# Patient Record
Sex: MALE | Race: WHITE | NOT HISPANIC OR LATINO
[De-identification: names, ages, dates, MRNs, and addresses within clinical notes are randomized per-mention and may not be internally consistent; named-entity substitution may affect disease eponyms.]

---

## 2024-03-04 ENCOUNTER — APPOINTMENT (OUTPATIENT)
Dept: ORTHOPEDIC SURGERY | Facility: CLINIC | Age: 69
End: 2024-03-04

## 2024-03-11 ENCOUNTER — APPOINTMENT (OUTPATIENT)
Dept: ORTHOPEDIC SURGERY | Facility: CLINIC | Age: 69
End: 2024-03-11
Payer: MEDICARE

## 2024-03-11 VITALS — BODY MASS INDEX: 28.64 KG/M2 | HEIGHT: 68 IN | WEIGHT: 189 LBS

## 2024-03-11 DIAGNOSIS — E78.00 PURE HYPERCHOLESTEROLEMIA, UNSPECIFIED: ICD-10-CM

## 2024-03-11 DIAGNOSIS — E11.9 TYPE 2 DIABETES MELLITUS W/OUT COMPLICATIONS: ICD-10-CM

## 2024-03-11 DIAGNOSIS — Z85.828 PERSONAL HISTORY OF OTHER MALIGNANT NEOPLASM OF SKIN: ICD-10-CM

## 2024-03-11 DIAGNOSIS — Z87.891 PERSONAL HISTORY OF NICOTINE DEPENDENCE: ICD-10-CM

## 2024-03-11 DIAGNOSIS — Z85.46 PERSONAL HISTORY OF MALIGNANT NEOPLASM OF PROSTATE: ICD-10-CM

## 2024-03-11 DIAGNOSIS — I10 ESSENTIAL (PRIMARY) HYPERTENSION: ICD-10-CM

## 2024-03-11 PROCEDURE — 73010 X-RAY EXAM OF SHOULDER BLADE: CPT | Mod: LT

## 2024-03-11 PROCEDURE — 73030 X-RAY EXAM OF SHOULDER: CPT | Mod: LT

## 2024-03-11 PROCEDURE — 99204 OFFICE O/P NEW MOD 45 MIN: CPT

## 2024-03-11 RX ORDER — LORATADINE 10 MG/1
10 TABLET ORAL
Refills: 0 | Status: ACTIVE | COMMUNITY

## 2024-03-11 RX ORDER — FENOFIBRATE 134 MG/1
134 CAPSULE ORAL
Refills: 0 | Status: ACTIVE | COMMUNITY

## 2024-03-11 RX ORDER — HYDROCHLOROTHIAZIDE 25 MG/1
25 TABLET ORAL
Refills: 0 | Status: ACTIVE | COMMUNITY

## 2024-03-11 RX ORDER — ATENOLOL 100 MG/1
100 TABLET ORAL
Refills: 0 | Status: ACTIVE | COMMUNITY

## 2024-03-11 RX ORDER — ASPIRIN 81 MG
81 TABLET, DELAYED RELEASE (ENTERIC COATED) ORAL
Refills: 0 | Status: ACTIVE | COMMUNITY

## 2024-03-11 RX ORDER — METFORMIN HYDROCHLORIDE 500 MG/1
500 TABLET, COATED ORAL
Refills: 0 | Status: ACTIVE | COMMUNITY

## 2024-03-11 RX ORDER — OMEPRAZOLE 20 MG/1
20 CAPSULE, DELAYED RELEASE ORAL
Refills: 0 | Status: ACTIVE | COMMUNITY

## 2024-03-11 RX ORDER — ATORVASTATIN CALCIUM 40 MG/1
40 TABLET, FILM COATED ORAL
Refills: 0 | Status: ACTIVE | COMMUNITY

## 2024-03-11 RX ORDER — CELECOXIB 200 MG/1
200 CAPSULE ORAL TWICE DAILY
Qty: 60 | Refills: 0 | Status: COMPLETED | COMMUNITY
Start: 2024-03-11 | End: 2024-04-10

## 2024-03-11 NOTE — ASSESSMENT
[FreeTextEntry1] : We reviewed the findings and the history. Questions were answered and concerns addressed. The options were outlined. PT is planned.  Celebrex is prescribed.  Celebrex has worked for him in the past. If symptoms persist, an MRI is planned.   Patient was seen by Dr. Boyd Costa. Patient was seen by Felicia OLIVEIRA under the supervision of Dr. Boyd Costa. Progress note was completed by Felicia OLIVEIRA. Entered by Gay Dickerson acting as scribe.

## 2024-03-11 NOTE — REASON FOR VISIT
[FreeTextEntry2] : This is a 68 year old RHD retired M with left shoulder pain since February 2024.  No injury.  Sleeping is uncomfortable.  Reaching is sore.  No numbness or medications.  His last A1C was 7.4.  He had surgery on his right rotator cuff around 2009, and this side is perfect.

## 2024-03-11 NOTE — HISTORY OF PRESENT ILLNESS
[de-identified] : 67yo HD M here for left shoulder pain that began 1 month ago. No injury. There are night symptoms. Pain is worse when laying down.  No formal treatment so far. Saw Dr. Costa for RTC tear, repaired surgically. Cannot recall if it was right or left.

## 2024-03-11 NOTE — CONSULT LETTER
[Dear  ___] : Dear  [unfilled], [Consult Letter:] : I had the pleasure of evaluating your patient, [unfilled]. [Please see my note below.] : Please see my note below. [Sincerely,] : Sincerely, [Consult Closing:] : Thank you very much for allowing me to participate in the care of this patient.  If you have any questions, please do not hesitate to contact me. [FreeTextEntry3] : Boyd Costa M.D. Shoulder Surgery

## 2024-03-11 NOTE — PHYSICAL EXAM
[Left] : left shoulder [Mild] : mild [Sitting] : sitting [5 ___] : forward flexion 5[unfilled]/5 [5___] : external rotation 5[unfilled]/5 [Right] : right shoulder [] : healed incision [TWNoteComboBox4] : passive forward flexion 150 degrees [de-identified] : external rotation 50 degrees [TWNoteComboBox6] : internal rotation L1

## 2024-04-01 ENCOUNTER — APPOINTMENT (OUTPATIENT)
Dept: ORTHOPEDIC SURGERY | Facility: CLINIC | Age: 69
End: 2024-04-01
Payer: MEDICARE

## 2024-04-01 PROCEDURE — 99214 OFFICE O/P EST MOD 30 MIN: CPT

## 2024-04-01 NOTE — REASON FOR VISIT
[FreeTextEntry2] : This is a 68 year old RHD retired M with left shoulder pain since February 2024.  No injury.  No numbness.  His last A1C was 7.4.  He had surgery on his right rotator cuff around 2009, and this side is perfect.  The Celebrex hasn't helped.  He has gone to PT without much difference.

## 2024-04-01 NOTE — PHYSICAL EXAM
[Left] : left shoulder [Mild] : mild [Sitting] : sitting [5 ___] : forward flexion 5[unfilled]/5 [5___] : external rotation 5[unfilled]/5 [Right] : right shoulder [Moderate] : moderate [] : no sensory deficits [TWNoteComboBox6] : internal rotation L1 [de-identified] : external rotation 50 degrees [TWNoteComboBox4] : passive forward flexion 150 degrees

## 2024-04-02 ENCOUNTER — RESULT REVIEW (OUTPATIENT)
Age: 69
End: 2024-04-02

## 2024-04-08 RX ORDER — DICLOFENAC SODIUM 75 MG/1
75 TABLET, DELAYED RELEASE ORAL
Qty: 40 | Refills: 0 | Status: ACTIVE | COMMUNITY
Start: 2024-04-08 | End: 1900-01-01

## 2024-04-22 ENCOUNTER — APPOINTMENT (OUTPATIENT)
Dept: ORTHOPEDIC SURGERY | Facility: CLINIC | Age: 69
End: 2024-04-22
Payer: MEDICARE

## 2024-04-22 VITALS — BODY MASS INDEX: 28.64 KG/M2 | HEIGHT: 68 IN | WEIGHT: 189 LBS

## 2024-04-22 PROCEDURE — 99214 OFFICE O/P EST MOD 30 MIN: CPT

## 2024-04-22 RX ORDER — MELOXICAM 15 MG/1
15 TABLET ORAL
Qty: 30 | Refills: 0 | Status: ACTIVE | COMMUNITY
Start: 2024-04-22 | End: 1900-01-01

## 2024-04-22 NOTE — PHYSICAL EXAM
[Left] : left shoulder [Sitting] : sitting [Moderate] : moderate [5 ___] : forward flexion 5[unfilled]/5 [5___] : external rotation 5[unfilled]/5 [Right] : right shoulder [] : no swelling [TWNoteComboBox4] : passive forward flexion 150 degrees [TWNoteComboBox6] : internal rotation L1 [de-identified] : external rotation 50 degrees

## 2024-04-22 NOTE — DATA REVIEWED
[FreeTextEntry1] : MRI LEFT SHOULDER images interpreted 4/2/24:  There is at least a medium full thickness supraspinatus tear 2.6cm. There is atrophy of the muscle belly. There is moderate AC joint arthrosis and evidence of prior decompression.  The biceps has ruptured.  There is partial subscap tearing.

## 2024-04-22 NOTE — REASON FOR VISIT
[FreeTextEntry2] : This is a 68 year old RHD retired M with left shoulder pain since February 2024.  No injury.  No numbness.  His last A1C was 7.4.  He had a left shoulder RTC repair and SAD 2009.  The Celebrex hasn't helped.  He has gone to PT without much difference.  He has been using CBD cream with some relief. An MRI was done.

## 2024-04-22 NOTE — ASSESSMENT
[FreeTextEntry1] : The MRI and imaging were reviewed. He is losing motion.  Meloxicam is planned.  PT is planned. If he does not progress, revision surgery can be an option.  Follow-up in 4-6 weeks. He will bring his wife next visit.    Patient was seen by Dr. Boyd Costa. Progress note completed by Sheri OLIVEIRA.

## 2024-05-06 ENCOUNTER — APPOINTMENT (OUTPATIENT)
Dept: ORTHOPEDIC SURGERY | Facility: CLINIC | Age: 69
End: 2024-05-06
Payer: MEDICARE

## 2024-05-06 PROCEDURE — 99214 OFFICE O/P EST MOD 30 MIN: CPT

## 2024-05-06 NOTE — PHYSICAL EXAM
[Left] : left shoulder [Sitting] : sitting [Moderate] : moderate [5 ___] : forward flexion 5[unfilled]/5 [5___] : external rotation 5[unfilled]/5 [Right] : right shoulder [] : healed incision

## 2024-05-17 ENCOUNTER — APPOINTMENT (OUTPATIENT)
Dept: ORTHOPEDIC SURGERY | Facility: CLINIC | Age: 69
End: 2024-05-17

## 2024-05-17 PROCEDURE — XXXXX: CPT | Mod: 1L

## 2024-05-17 RX ORDER — KETOROLAC TROMETHAMINE 10 MG/1
10 TABLET, FILM COATED ORAL EVERY 6 HOURS
Qty: 20 | Refills: 0 | Status: ACTIVE | COMMUNITY
Start: 2024-05-17 | End: 1900-01-01

## 2024-05-17 RX ORDER — GABAPENTIN 300 MG/1
300 CAPSULE ORAL 3 TIMES DAILY
Qty: 15 | Refills: 0 | Status: ACTIVE | COMMUNITY
Start: 2024-05-17 | End: 1900-01-01

## 2024-05-23 ENCOUNTER — APPOINTMENT (OUTPATIENT)
Age: 69
End: 2024-05-23
Payer: MEDICARE

## 2024-05-23 PROCEDURE — 29823 SHO ARTHRS SRG XTNSV DBRDMT: CPT | Mod: AS,LT

## 2024-05-23 PROCEDURE — 29823 SHO ARTHRS SRG XTNSV DBRDMT: CPT | Mod: LT

## 2024-05-23 PROCEDURE — 29826 SHO ARTHRS SRG DECOMPRESSION: CPT | Mod: AS,LT

## 2024-05-23 PROCEDURE — 29827 SHO ARTHRS SRG RT8TR CUF RPR: CPT | Mod: AS,LT

## 2024-05-23 PROCEDURE — 29826 SHO ARTHRS SRG DECOMPRESSION: CPT | Mod: LT

## 2024-05-23 PROCEDURE — 29827 SHO ARTHRS SRG RT8TR CUF RPR: CPT | Mod: LT

## 2024-05-29 ENCOUNTER — APPOINTMENT (OUTPATIENT)
Dept: ORTHOPEDIC SURGERY | Facility: CLINIC | Age: 69
End: 2024-05-29
Payer: MEDICARE

## 2024-05-29 PROCEDURE — 99024 POSTOP FOLLOW-UP VISIT: CPT

## 2024-05-29 PROCEDURE — 73030 X-RAY EXAM OF SHOULDER: CPT | Mod: LT

## 2024-05-29 NOTE — REASON FOR VISIT
[FreeTextEntry2] : This is a 68 year old RHD retired M with left shoulder pain since February 2024. No injury.  He had a left shoulder RTC repair and SAD 2009.  DOS: 5/23/2024 Procedure: Left Shoulder Arthroscopy, Glenohumeral Debridement, Loose Body Hardware Removal, Subacromial Decompression, Revision Medium Rotator Cuff Repair Diagnosis: Recurrent Medium Rotator Cuff Tear, Biceps Rupture, Subacromial Impingement, Shoulder Pain, Glenohumeral Synovitis, Glenohumeral Chondrosis, SLAP Tear, Partial Anterior Labral Tear, Partial Posterior Labral Tear, Loose Suture Hardware  No f/c/s or PT.  He is in his sling.

## 2024-05-29 NOTE — PHYSICAL EXAM
[Left] : left shoulder [] : no sensory deficits [FreeTextEntry9] : Range of motion was not assessed. [de-identified] : Strength was not assessed.

## 2024-05-29 NOTE — ASSESSMENT
[FreeTextEntry1] : We reviewed the scope pictures. PT outlined. Sling use outlined. Table slides demonstrated and advised.  Questions answered.  Patient was seen by Dr. Boyd Costa. Patient was seen by Felicia OLIVEIRA under the supervision of Dr. Boyd Costa. Progress note was completed by Felicia OLIVEIRA. Entered by Gay Dickerson acting as scribe.

## 2024-06-24 ENCOUNTER — APPOINTMENT (OUTPATIENT)
Dept: ORTHOPEDIC SURGERY | Facility: CLINIC | Age: 69
End: 2024-06-24
Payer: MEDICARE

## 2024-06-24 VITALS — WEIGHT: 186 LBS | BODY MASS INDEX: 28.19 KG/M2 | HEIGHT: 68 IN

## 2024-06-24 DIAGNOSIS — M75.42 IMPINGEMENT SYNDROME OF LEFT SHOULDER: ICD-10-CM

## 2024-06-24 DIAGNOSIS — M75.122 COMPLETE ROTATOR CUFF TEAR OR RUPTURE OF LEFT SHOULDER, NOT SPECIFIED AS TRAUMATIC: ICD-10-CM

## 2024-06-24 PROCEDURE — 99024 POSTOP FOLLOW-UP VISIT: CPT

## 2024-06-24 RX ORDER — HYDROCODONE BITARTRATE AND ACETAMINOPHEN 7.5; 325 MG/1; MG/1
7.5-325 TABLET ORAL
Qty: 42 | Refills: 0 | Status: ACTIVE | COMMUNITY
Start: 2024-05-17 | End: 1900-01-01

## 2024-07-24 ENCOUNTER — APPOINTMENT (OUTPATIENT)
Dept: ORTHOPEDIC SURGERY | Facility: CLINIC | Age: 69
End: 2024-07-24

## 2024-08-16 ENCOUNTER — APPOINTMENT (OUTPATIENT)
Dept: ORTHOPEDIC SURGERY | Facility: CLINIC | Age: 69
End: 2024-08-16
Payer: MEDICARE

## 2024-08-16 DIAGNOSIS — M75.122 COMPLETE ROTATOR CUFF TEAR OR RUPTURE OF LEFT SHOULDER, NOT SPECIFIED AS TRAUMATIC: ICD-10-CM

## 2024-08-16 DIAGNOSIS — M75.42 IMPINGEMENT SYNDROME OF LEFT SHOULDER: ICD-10-CM

## 2024-08-16 PROCEDURE — 99024 POSTOP FOLLOW-UP VISIT: CPT

## 2024-08-16 RX ORDER — METHYLPREDNISOLONE 4 MG/1
4 TABLET ORAL
Qty: 1 | Refills: 0 | Status: ACTIVE | COMMUNITY
Start: 2024-08-16 | End: 1900-01-01

## 2024-08-16 NOTE — ASSESSMENT
[FreeTextEntry1] : We reviewed the findings. This seems to be more inflammation. MDP planned. PT will continue. Questions answered.  Patient was seen by Dr. Boyd Costa. Patient was seen by Felicia OLIVEIRA under the supervision of Dr. Boyd Costa. Progress note was completed by Felicia OLIVEIRA.

## 2024-08-16 NOTE — HISTORY OF PRESENT ILLNESS
[Retired] : Work status: retired [de-identified] : PO #3  L shoulder in the sling, doing better , has pain to sleep  [] : Post Surgical Visit: no

## 2024-08-16 NOTE — PHYSICAL EXAM
[Left] : left shoulder [Sitting] : sitting [Moderate] : moderate [Mild] : mild [] : no sensory deficits [FreeTextEntry9] : IR was not assessed. [de-identified] : Strength was not assessed. [de-identified] : +arc [TWNoteComboBox4] : passive forward flexion 160 degrees [de-identified] : external rotation 50 degrees

## 2024-08-16 NOTE — REASON FOR VISIT
[FreeTextEntry2] : This is a 69 year old RHD retired M with left shoulder pain since February 2024. No injury.  He had a left shoulder RTC repair and SAD 2009.  DOS: 5/23/2024 Procedure: Left Shoulder Arthroscopy, Glenohumeral Debridement, Loose Body Hardware Removal, Subacromial Decompression, Revision Medium Rotator Cuff Repair Diagnosis: Recurrent Medium Rotator Cuff Tear, Biceps Rupture, Subacromial Impingement, Shoulder Pain, Glenohumeral Synovitis, Glenohumeral Chondrosis, SLAP Tear, Partial Anterior Labral Tear, Partial Posterior Labral Tear, Loose Suture Hardware  He is out of the sling.  He continues with PT.  He did lift a 1 lb weight on 8/6/24 then stretched with therapy and had soreness in the following days.  Getting a jar out of the refrigerator was painful.  He is sleeping OK.  He resumed his Mobic with some relief.  His recent A1C was 6.7.

## 2024-09-27 ENCOUNTER — APPOINTMENT (OUTPATIENT)
Dept: ORTHOPEDIC SURGERY | Facility: CLINIC | Age: 69
End: 2024-09-27
Payer: MEDICARE

## 2024-09-27 VITALS — HEIGHT: 68 IN | WEIGHT: 186 LBS | BODY MASS INDEX: 28.19 KG/M2

## 2024-09-27 DIAGNOSIS — M75.122 COMPLETE ROTATOR CUFF TEAR OR RUPTURE OF LEFT SHOULDER, NOT SPECIFIED AS TRAUMATIC: ICD-10-CM

## 2024-09-27 PROCEDURE — 99213 OFFICE O/P EST LOW 20 MIN: CPT

## 2024-09-27 NOTE — PHYSICAL EXAM
[Left] : left shoulder [Sitting] : sitting [Moderate] : moderate [Mild] : mild [5 ___] : forward flexion 5[unfilled]/5 [] : no sensory deficits [FreeTextEntry9] : IR was not assessed. [de-identified] : +arc [TWNoteComboBox4] : passive forward flexion 165 degrees [TWNoteComboBox6] : internal rotation L1 [de-identified] : external rotation 60 degrees

## 2024-09-27 NOTE — ASSESSMENT
[FreeTextEntry1] : He is progressing nicely. We reviewed his course. He will begin HEP since his PT is exhausted. We discussed NSAID use as needed. He still needs Advil nicely. Follow up 2 months.  Patient seen by Dr. Boyd Costa, who determined the assessment and plan. Felicia Suggs PAC was present for and participated in aspects of the office encounter. I, Maggi Smith, am scribing for Dr. Boyd Costa in his presence for the chief complaint, physical exam, studies, assessment, and/or plan.

## 2024-09-27 NOTE — HISTORY OF PRESENT ILLNESS
[2] : 2 [1] : 2 [Occasional] : occasional [de-identified] : Patient here for follow up on the left shoulder. Patient states that he is feeling a lot better compared to before. He does feel sore still though. Patient was going to PT; however, Medicare is no longer covering it for him, so he is now doing his own at home PT.   [FreeTextEntry1] : Left Shoulder  [FreeTextEntry6] : Sore

## 2024-09-27 NOTE — REASON FOR VISIT
[FreeTextEntry2] : This is a 69 year old RHD retired M with left shoulder pain since February 2024. No injury.  He had a left shoulder RTC repair and SAD 2009.  DOS: 5/23/2024 Procedure: Left Shoulder Arthroscopy, Glenohumeral Debridement, Loose Body Hardware Removal, Subacromial Decompression, Revision Medium Rotator Cuff Repair Diagnosis: Recurrent Medium Rotator Cuff Tear, Biceps Rupture, Subacromial Impingement, Shoulder Pain, Glenohumeral Synovitis, Glenohumeral Chondrosis, SLAP Tear, Partial Anterior Labral Tear, Partial Posterior Labral Tear, Loose Suture Hardware  The MDP helped.  He does his HEP.  [facility] stopped his PT in early September 2024.  There is pain at night.

## 2024-11-22 ENCOUNTER — APPOINTMENT (OUTPATIENT)
Dept: ORTHOPEDIC SURGERY | Facility: CLINIC | Age: 69
End: 2024-11-22
Payer: MEDICARE

## 2024-11-22 VITALS — BODY MASS INDEX: 28.64 KG/M2 | WEIGHT: 189 LBS | HEIGHT: 68 IN

## 2024-11-22 VITALS — BODY MASS INDEX: 28.19 KG/M2 | WEIGHT: 186 LBS | HEIGHT: 68 IN

## 2024-11-22 DIAGNOSIS — M75.122 COMPLETE ROTATOR CUFF TEAR OR RUPTURE OF LEFT SHOULDER, NOT SPECIFIED AS TRAUMATIC: ICD-10-CM

## 2024-11-22 DIAGNOSIS — M75.42 IMPINGEMENT SYNDROME OF LEFT SHOULDER: ICD-10-CM

## 2024-11-22 PROCEDURE — 99213 OFFICE O/P EST LOW 20 MIN: CPT
